# Patient Record
Sex: FEMALE | Race: AMERICAN INDIAN OR ALASKA NATIVE
[De-identification: names, ages, dates, MRNs, and addresses within clinical notes are randomized per-mention and may not be internally consistent; named-entity substitution may affect disease eponyms.]

---

## 2022-08-11 ENCOUNTER — HOSPITAL ENCOUNTER (EMERGENCY)
Dept: HOSPITAL 5 - ED | Age: 27
Discharge: HOME | End: 2022-08-11
Payer: MEDICAID

## 2022-08-11 VITALS — DIASTOLIC BLOOD PRESSURE: 77 MMHG | SYSTOLIC BLOOD PRESSURE: 126 MMHG

## 2022-08-11 DIAGNOSIS — J06.9: Primary | ICD-10-CM

## 2022-08-11 PROCEDURE — 99282 EMERGENCY DEPT VISIT SF MDM: CPT

## 2022-08-11 NOTE — EMERGENCY DEPARTMENT REPORT
Minor Respiratory





- HPI


Chief Complaint: Upper Respiratory Infection


Stated Complaint: COUGH/NOSE BLOOD


Time Seen by Provider: 08/11/22 20:58


Minor Respiratory: Yes Rhinorrhea, Yes Sore Throat, Yes Ear Pain, Yes Cough





ED Review of Systems


ROS: 


Stated complaint: COUGH/NOSE BLOOD


Other details as noted in HPI





Constitutional: see HPI, fever


ENT: ear pain, throat pain


Respiratory: cough


Cardiovascular: as per HPI


Endocrine: see HPI


Gastrointestinal: as per HPI, nausea.  denies: vomiting, diarrhea, constipation


Musculoskeletal: as per HPI


Skin: as per HPI


Neurological: as per HPI


Psychiatric: as per HPI


Hematological/Lymphatic: as per HPI





ED Past Medical Hx





- Past Medical History


Previous Medical History?: No





- Surgical History


Past Surgical History?: No





- Medications


Home Medications: 


                                Home Medications











 Medication  Instructions  Recorded  Confirmed  Last Taken  Type


 


Amoxicillin/K Clav Tab [Augmentin 1 tab PO Q12HR #14 tab 08/11/22  Unknown Rx





875 mg]     


 


Ondansetron (Nf) [Zofran TAB] 8 mg PO Q8HR PRN #10 tablet 08/11/22  Unknown Rx














Minor Respiratory Exam





- Exam


General: 


Vital signs noted. No distress. Alert and acting appropriately.





HEENT: Yes Pharyngeal Erythema, Yes Moist Mucous Membranes, No Pharyngeal 

Exudates, No Rhinorrhea, No Conjuctival Injection, No Frontal Tenderness, No 

Maxillary Tenderness


Ear: Both TM Erythema, Both EAC Pain, Neither EAC Discharge


Neck: No Adenopathy, No Supple


Lungs: Yes Good Air Exchange, Yes Cough, No Wheezes, No Ronchi, No Stridor, No 

Labored Respirations, No Retractions, No Use of Accessory Muscles, No Other 

Abnormal Lung Sounds


Heart: Yes Regular


Abdomen: No Tenderness, No Peritoneal Signs, No Normal Bowel Sounds


Skin: No Rash, No Edema


Neurologic: 


Alert and oriented, no deficits.








Musculoskeletal: 


Unremarkable.











ED Course


                                   Vital Signs











  08/11/22





  18:23


 


Temperature 98.5 F


 


Pulse Rate 73


 


Respiratory 18





Rate 


 


Blood Pressure 126/77





[Left] 


 


O2 Sat by Pulse 99





Oximetry 











Critical care attestation.: 


If time is entered above; I have spent that time in minutes in the direct care 

of this critically ill patient, excluding procedure time.








ED Disposition


Clinical Impression: 


 URI (upper respiratory infection)





Disposition: 01 HOME / SELF CARE / HOMELESS


Is pt being admited?: No


Does the pt Need Aspirin: No


Condition: Stable


Instructions:  Upper Respiratory Infection, Adult


Prescriptions: 


Amoxicillin/K Clav Tab [Augmentin 875 mg] 1 tab PO Q12HR #14 tab


Ondansetron (Nf) [Zofran TAB] 8 mg PO Q8HR PRN #10 tablet


 PRN Reason: Nausea And Vomiting


Referrals: 


CECILIA BEARD MD [Staff Physician] - 3-5 Days